# Patient Record
Sex: MALE
[De-identification: names, ages, dates, MRNs, and addresses within clinical notes are randomized per-mention and may not be internally consistent; named-entity substitution may affect disease eponyms.]

---

## 2018-04-30 ENCOUNTER — HOSPITAL ENCOUNTER (EMERGENCY)
Dept: HOSPITAL 14 - H.ER | Age: 48
Discharge: HOME | End: 2018-04-30
Payer: COMMERCIAL

## 2018-04-30 VITALS
TEMPERATURE: 98.5 F | SYSTOLIC BLOOD PRESSURE: 135 MMHG | DIASTOLIC BLOOD PRESSURE: 79 MMHG | HEART RATE: 77 BPM | OXYGEN SATURATION: 100 % | RESPIRATION RATE: 19 BRPM

## 2018-04-30 DIAGNOSIS — M54.2: Primary | ICD-10-CM
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
## 2018-04-30 NOTE — RAD
PROCEDURE:  Cervical Spine Radiographs.



HISTORY:

Pain. 



COMPARISON:

None. 



FINDINGS:



BONES:

There is normal alignment of the cervical vertebral bodies.  There is 

normal cervical lordosis.  Vertebral height is normal. Bone 

mineralization is normal. There is no acute fracture or traumatic 

anterior listhesis. The craniocervical junction is normal.  The 

atlantoaxial joint normal. 



DISC SPACES:

There is mild degenerative disc disease at C5-6 with mild anterior 

spurring, reduced disc height and facet arthropathy.  The remaining 

disc heights are maintained 



SOFT TISSUES:

Normal. No prevertebral soft tissue swelling. 



OTHER FINDINGS:

None.



IMPRESSION:

No acute fracture or traumatic anterolisthesis.



Mild degenerative disc disease at C5-6.